# Patient Record
Sex: MALE | Employment: FULL TIME | URBAN - METROPOLITAN AREA
[De-identification: names, ages, dates, MRNs, and addresses within clinical notes are randomized per-mention and may not be internally consistent; named-entity substitution may affect disease eponyms.]

---

## 2021-06-30 ENCOUNTER — HOSPITAL ENCOUNTER (EMERGENCY)
Facility: CLINIC | Age: 31
Discharge: HOME | End: 2021-07-01
Attending: INTERNAL MEDICINE | Admitting: INTERNAL MEDICINE

## 2021-06-30 DIAGNOSIS — S40.811A INFECTED ABRASION OF RIGHT UPPER ARM, INITIAL ENCOUNTER: ICD-10-CM

## 2021-06-30 DIAGNOSIS — L08.9 INFECTED ABRASION OF RIGHT UPPER ARM, INITIAL ENCOUNTER: ICD-10-CM

## 2021-06-30 DIAGNOSIS — S46.912A MUSCLE STRAIN OF LEFT UPPER ARM, INITIAL ENCOUNTER: ICD-10-CM

## 2021-06-30 DIAGNOSIS — S60.512A ABRASION OF LEFT HAND, INITIAL ENCOUNTER: ICD-10-CM

## 2021-06-30 PROCEDURE — 99284 EMERGENCY DEPT VISIT MOD MDM: CPT | Performed by: INTERNAL MEDICINE

## 2021-06-30 RX ORDER — KETOROLAC TROMETHAMINE 30 MG/ML
30 INJECTION, SOLUTION INTRAMUSCULAR; INTRAVENOUS ONCE
Status: COMPLETED | OUTPATIENT
Start: 2021-06-30 | End: 2021-07-01

## 2021-06-30 ASSESSMENT — ENCOUNTER SYMPTOMS
WOUND: 1
HEADACHES: 0
COLOR CHANGE: 1

## 2021-07-01 ENCOUNTER — APPOINTMENT (OUTPATIENT)
Dept: GENERAL RADIOLOGY | Facility: CLINIC | Age: 31
End: 2021-07-01
Attending: INTERNAL MEDICINE

## 2021-07-01 VITALS
OXYGEN SATURATION: 99 % | SYSTOLIC BLOOD PRESSURE: 147 MMHG | DIASTOLIC BLOOD PRESSURE: 103 MMHG | WEIGHT: 262.3 LBS | HEART RATE: 71 BPM | TEMPERATURE: 99.1 F | RESPIRATION RATE: 18 BRPM

## 2021-07-01 PROCEDURE — 90471 IMMUNIZATION ADMIN: CPT | Performed by: INTERNAL MEDICINE

## 2021-07-01 PROCEDURE — 90715 TDAP VACCINE 7 YRS/> IM: CPT | Performed by: INTERNAL MEDICINE

## 2021-07-01 PROCEDURE — 250N000011 HC RX IP 250 OP 636: Performed by: INTERNAL MEDICINE

## 2021-07-01 PROCEDURE — 73060 X-RAY EXAM OF HUMERUS: CPT | Mod: LT

## 2021-07-01 PROCEDURE — 96372 THER/PROPH/DIAG INJ SC/IM: CPT | Performed by: INTERNAL MEDICINE

## 2021-07-01 RX ORDER — NAPROXEN 500 MG/1
500 TABLET ORAL 2 TIMES DAILY PRN
Qty: 30 TABLET | Refills: 0 | Status: SHIPPED | OUTPATIENT
Start: 2021-07-01

## 2021-07-01 RX ADMIN — KETOROLAC TROMETHAMINE 30 MG: 30 INJECTION, SOLUTION INTRAMUSCULAR; INTRAVENOUS at 00:07

## 2021-07-01 RX ADMIN — CLOSTRIDIUM TETANI TOXOID ANTIGEN (FORMALDEHYDE INACTIVATED), CORYNEBACTERIUM DIPHTHERIAE TOXOID ANTIGEN (FORMALDEHYDE INACTIVATED), BORDETELLA PERTUSSIS TOXOID ANTIGEN (GLUTARALDEHYDE INACTIVATED), BORDETELLA PERTUSSIS FILAMENTOUS HEMAGGLUTININ ANTIGEN (FORMALDEHYDE INACTIVATED), BORDETELLA PERTUSSIS PERTACTIN ANTIGEN, AND BORDETELLA PERTUSSIS FIMBRIAE 2/3 ANTIGEN 0.5 ML: 5; 2; 2.5; 5; 3; 5 INJECTION, SUSPENSION INTRAMUSCULAR at 00:04

## 2021-07-01 ASSESSMENT — ENCOUNTER SYMPTOMS
NECK STIFFNESS: 0
CONFUSION: 0
FEVER: 0
SHORTNESS OF BREATH: 0
ABDOMINAL PAIN: 0
DIFFICULTY URINATING: 0
EYE REDNESS: 0
ARTHRALGIAS: 0

## 2021-07-01 NOTE — ED PROVIDER NOTES
"ED Provider Note  [facility]      History     Chief Complaint   Patient presents with     Shoulder Pain     Fell at work, landing on left shoulder, weakness in the left arm and much pain in the shoulder. Denies hitting head; also has linear abrasion on right arm     Hand Injury     grabbing onto metal, stepped onto a pail that tipped and ended up cutting his left hand on the metal     Knee Pain     reports he bruised his knee, \"not much pain\"     The history is provided by the patient. A  was used ([employer] ).     Luke Lemus is a 30 year old otherwise healthy male who presents to the Emergency Department for evaluation of a laceration to the palmar aspect of his left hand and a small abrasion on his right arm as well as left shoulder/upper arm pain and a bruise behind his left knee.  Patient reports that he was at work around 2 PM, standing on a pale, when the pale slipped out from underneath him.  Patient hit the back of his knee against the pale during the fall and he now has a small bruise in that area.  He also reports that he was holding onto a piece of metal which lacerated the palmar aspect of his left hand as well as caused a small abrasion on his right upper arm.  Patient denies striking his head during the fall and denies any loss of consciousness.  Patient states that this evening, approximately 40 minutes prior to arrival, he was in the shower and tried to scrub his back.  When he reached towards his back with his left hand, he had sudden onset of pain in his anterior left shoulder going into the bicep.  Patient states that this pain has been constant since and he is having difficulty moving his left arm secondary to pain.  Patient presents to the Emergency Department now for evaluation.  Per review of [facility] records, patient's last tetanus immunization was in 2020.    Past Medical History  No past medical history on " file.  No past surgical history on file.  naproxen (NAPROSYN) 500 MG tablet      No Known Allergies  Family History  No family history on file.  Social History   Social History     Tobacco Use     Smoking status: Not on file   Substance Use Topics     Alcohol use: Not on file     Drug use: Not on file      Past medical history, past surgical history, medications, allergies, family history, and social history were reviewed with the patient. No additional pertinent items.       Review of Systems   Constitutional: Negative for fever.   HENT: Negative for congestion.    Eyes: Negative for redness.   Respiratory: Negative for shortness of breath.    Cardiovascular: Negative for chest pain.   Gastrointestinal: Negative for abdominal pain.   Genitourinary: Negative for difficulty urinating.   Musculoskeletal: Negative for arthralgias and neck stiffness.        Positive for left anterior shoulder pain going into bicep   Skin: Positive for color change (bruise behind left knee) and wound (laceration on left hand and abrasion on right upper arm).   Neurological: Negative for syncope and headaches.   Psychiatric/Behavioral: Negative for confusion.   All other systems reviewed and are negative.      Physical Exam   BP: (!) 147/103  Pulse: 71  Temp: 99.1  F (37.3  C)  Resp: 18  Weight: 119 kg (262 lb 4.8 oz)  SpO2: 99 %  Physical Exam  Constitutional:       General: He is not in acute distress.     Appearance: He is not diaphoretic.   HENT:      Head: Atraumatic.   Eyes:      General: No scleral icterus.     Pupils: Pupils are equal, round, and reactive to light.   Neck:      Musculoskeletal: Neck supple.   Cardiovascular:      Rate and Rhythm: Normal rate and regular rhythm.      Heart sounds: Normal heart sounds. No murmur. No friction rub. No gallop.    Pulmonary:      Effort: Pulmonary effort is normal. No respiratory distress.      Breath sounds: Normal breath sounds. No stridor. No wheezing, rhonchi or rales.   Chest:       Chest wall: No tenderness.   Abdominal:      General: Abdomen is flat. Bowel sounds are normal. There is no distension.      Palpations: Abdomen is soft. There is no mass.      Tenderness: There is no abdominal tenderness. There is no right CVA tenderness, left CVA tenderness, guarding or rebound.      Hernia: No hernia is present.   Musculoskeletal:      Left shoulder: He exhibits decreased range of motion, tenderness, pain and spasm. He exhibits no bony tenderness, no swelling, no effusion, no crepitus, no deformity, no laceration, normal pulse and normal strength.      Cervical back: He exhibits no tenderness.      Thoracic back: He exhibits no tenderness.      Lumbar back: He exhibits no tenderness.      Right upper arm: He exhibits laceration. He exhibits no tenderness, no bony tenderness, no swelling, no edema and no deformity.        Arms:       Left hand: He exhibits laceration. He exhibits normal range of motion, no tenderness, no bony tenderness, normal two-point discrimination, normal capillary refill, no deformity and no swelling. Normal sensation noted. Normal strength noted.        Hands:       Left upper leg: He exhibits tenderness. He exhibits no bony tenderness, no swelling, no edema, no deformity and no laceration.        Legs:    Skin:     General: Skin is warm.      Findings: No rash.   Neurological:      Mental Status: He is oriented to person, place, and time.         ED Course      Procedures     11:37 PM  The patient was seen and examined by Dylan Eng MD in Room ED01.                Results for orders placed or performed during the hospital encounter of 06/30/21   Humerus XR,  G/E 2 views, left     Status: None (Preliminary result)    Narrative    EXAM: LEFT HUMERUS 2 VIEWS  LOCATION: [facility]  DATE/TIME: 7/1/2021 12:48 AM    INDICATION: Pain.  COMPARISON: None.      Impression    IMPRESSION: No visualized acute fracture, malalignment or other acute osseous abnormality of  the left humerus.      Medications   Tdap (tetanus-diphtheria-acell pertussis) (ADACEL) injection 0.5 mL (0.5 mLs Intramuscular Given 7/1/21 0004)   ketorolac (TORADOL) injection 30 mg (30 mg Intramuscular Given 7/1/21 0007)        Assessments & Plan (with Medical Decision Making)  Left upper arm strain, XR neg for any fx or dislocation, given toradol im with improvements, will discharge with naproxen prn, follow up with his PMD.  Superficial lacs on right upper arm and left hand, local wound care done.       I have reviewed the nursing notes. I have reviewed the findings, diagnosis, plan and need for follow up with the patient.    New Prescriptions    NAPROXEN (NAPROSYN) 500 MG TABLET    Take 1 tablet (500 mg) by mouth 2 times daily as needed for moderate pain       Final diagnoses:   Muscle strain of left upper arm, initial encounter   Abrasion of left hand, initial encounter   Infected abrasion of right upper arm, initial encounter       --  I, Kenny Olmos, am serving as a trained medical scribe to document services personally performed by Dylan Eng MD, based on the provider's statements to me.     IDylan MD, was physically present and have reviewed and verified the accuracy of this note documented by Kenny Olmos.    Dylan Eng MD  [facility] EMERGENCY DEPARTMENT  6/30/2021     Dylan Eng MD  07/01/21 0110

## 2021-07-19 ENCOUNTER — APPOINTMENT (OUTPATIENT)
Dept: INTERPRETER SERVICES | Facility: CLINIC | Age: 31
End: 2021-07-19